# Patient Record
Sex: MALE | URBAN - METROPOLITAN AREA
[De-identification: names, ages, dates, MRNs, and addresses within clinical notes are randomized per-mention and may not be internally consistent; named-entity substitution may affect disease eponyms.]

---

## 2018-06-06 ENCOUNTER — OFFICE VISIT (OUTPATIENT)
Dept: URBAN - METROPOLITAN AREA CLINIC 29 | Facility: CLINIC | Age: 63
End: 2018-06-06
Payer: COMMERCIAL

## 2018-06-06 DIAGNOSIS — H25.813 COMBINED FORMS OF AGE-RELATED CATARACT, BILATERAL: Primary | ICD-10-CM

## 2018-06-06 PROCEDURE — 99204 OFFICE O/P NEW MOD 45 MIN: CPT | Performed by: OPHTHALMOLOGY

## 2018-06-06 RX ORDER — DUREZOL 0.5 MG/ML
0.05 % EMULSION OPHTHALMIC
Qty: 5 | Refills: 1 | Status: INACTIVE
Start: 2018-06-06 | End: 2018-07-03

## 2018-06-06 RX ORDER — BESIFLOXACIN 6 MG/ML
0.6 % SUSPENSION OPHTHALMIC
Qty: 5 | Refills: 1 | Status: INACTIVE
Start: 2018-06-06 | End: 2018-06-14

## 2018-06-06 ASSESSMENT — VISUAL ACUITY
OS: 20/40
OD: 20/20

## 2018-06-06 ASSESSMENT — INTRAOCULAR PRESSURE
OS: 19
OD: 17

## 2018-06-06 NOTE — IMPRESSION/PLAN
Impression: Combined forms of age-related cataract, bilateral: H25.813. .  Visually significant, quality of life issue, could improve with surgery. Plan: Cataracts account for the patient's complaints. Discussed all risks, benefits, alternatives, procedures and recovery. Patient understands changing glasses will not improve vision. Patient desires to have surgery, recommend phacoemulsification with intraocular lens implant OS.   lvl 2 - pt declines premium IOL

## 2018-07-30 ENCOUNTER — PROCEDURE (OUTPATIENT)
Dept: URBAN - METROPOLITAN AREA CLINIC 29 | Facility: CLINIC | Age: 63
End: 2018-07-30
Payer: COMMERCIAL

## 2018-07-30 PROCEDURE — 76519 ECHO EXAM OF EYE: CPT | Performed by: OPHTHALMOLOGY

## 2018-07-30 RX ORDER — DUREZOL 0.5 MG/ML
0.05 % EMULSION OPHTHALMIC
Qty: 5 | Refills: 1 | Status: INACTIVE
Start: 2018-07-30 | End: 2018-08-14

## 2018-07-30 RX ORDER — BESIFLOXACIN 6 MG/ML
0.6 % SUSPENSION OPHTHALMIC
Qty: 5 | Refills: 1 | Status: INACTIVE
Start: 2018-07-30 | End: 2018-08-14

## 2018-08-13 ENCOUNTER — SURGERY (OUTPATIENT)
Dept: URBAN - METROPOLITAN AREA SURGERY 11 | Facility: SURGERY | Age: 63
End: 2018-08-13
Payer: COMMERCIAL

## 2018-08-14 ENCOUNTER — POST-OPERATIVE VISIT (OUTPATIENT)
Dept: URBAN - METROPOLITAN AREA CLINIC 29 | Facility: CLINIC | Age: 63
End: 2018-08-14

## 2018-08-14 PROCEDURE — 99024 POSTOP FOLLOW-UP VISIT: CPT | Performed by: OPTOMETRIST

## 2018-08-14 RX ORDER — DUREZOL 0.5 MG/ML
0.05 % EMULSION OPHTHALMIC
Qty: 5 | Refills: 1 | Status: INACTIVE
Start: 2018-08-14 | End: 2018-08-28

## 2018-08-14 RX ORDER — BESIFLOXACIN 6 MG/ML
0.6 % SUSPENSION OPHTHALMIC
Qty: 5 | Refills: 1 | Status: INACTIVE
Start: 2018-08-14 | End: 2018-08-21

## 2018-08-14 ASSESSMENT — INTRAOCULAR PRESSURE
OS: 20
OD: 20

## 2018-08-22 ENCOUNTER — OFFICE VISIT (OUTPATIENT)
Dept: URBAN - METROPOLITAN AREA CLINIC 29 | Facility: CLINIC | Age: 63
End: 2018-08-22
Payer: COMMERCIAL

## 2018-08-22 DIAGNOSIS — Z96.1 PRESENCE OF INTRAOCULAR LENS: ICD-10-CM

## 2018-08-22 DIAGNOSIS — H25.811 COMBINED FORMS OF AGE-RELATED CATARACT, RIGHT EYE: Primary | ICD-10-CM

## 2018-08-22 PROCEDURE — 99213 OFFICE O/P EST LOW 20 MIN: CPT | Performed by: OPHTHALMOLOGY

## 2018-08-22 ASSESSMENT — INTRAOCULAR PRESSURE
OD: 20
OS: 20

## 2018-08-22 ASSESSMENT — VISUAL ACUITY: OD: 20/20

## 2018-08-22 NOTE — IMPRESSION/PLAN
Impression: Presence of intraocular lens: Z96.1. OS. Condition: established, stable. Plan: d/c abx, taper steroid per plan. d/c shield.

## 2018-08-27 ENCOUNTER — SURGERY (OUTPATIENT)
Dept: URBAN - METROPOLITAN AREA SURGERY 11 | Facility: SURGERY | Age: 63
End: 2018-08-27
Payer: COMMERCIAL

## 2018-08-27 PROCEDURE — 66984 XCAPSL CTRC RMVL W/O ECP: CPT | Performed by: OPHTHALMOLOGY

## 2018-08-28 ENCOUNTER — POST-OPERATIVE VISIT (OUTPATIENT)
Dept: URBAN - METROPOLITAN AREA CLINIC 29 | Facility: CLINIC | Age: 63
End: 2018-08-28

## 2018-08-28 PROCEDURE — 99024 POSTOP FOLLOW-UP VISIT: CPT | Performed by: OPHTHALMOLOGY

## 2018-08-28 RX ORDER — BESIFLOXACIN 6 MG/ML
0.6 % SUSPENSION OPHTHALMIC
Qty: 5 | Refills: 1 | Status: INACTIVE
Start: 2018-08-28 | End: 2018-09-04

## 2018-08-28 RX ORDER — DUREZOL 0.5 MG/ML
0.05 % EMULSION OPHTHALMIC
Qty: 5 | Refills: 1 | Status: INACTIVE
Start: 2018-08-28 | End: 2018-09-25

## 2018-08-28 ASSESSMENT — INTRAOCULAR PRESSURE
OS: 19
OD: 19

## 2018-09-04 ENCOUNTER — POST-OPERATIVE VISIT (OUTPATIENT)
Dept: URBAN - METROPOLITAN AREA CLINIC 29 | Facility: CLINIC | Age: 63
End: 2018-09-04

## 2018-09-04 DIAGNOSIS — Z09 ENCNTR FOR F/U EXAM AFT TRTMT FOR COND OTH THAN MALIG NEOPLM: Primary | ICD-10-CM

## 2018-09-04 PROCEDURE — 99024 POSTOP FOLLOW-UP VISIT: CPT | Performed by: OPTOMETRIST

## 2018-09-04 ASSESSMENT — INTRAOCULAR PRESSURE
OD: 18
OS: 18

## 2018-10-05 ENCOUNTER — POST-OPERATIVE VISIT (OUTPATIENT)
Dept: URBAN - METROPOLITAN AREA CLINIC 29 | Facility: CLINIC | Age: 63
End: 2018-10-05

## 2018-10-05 PROCEDURE — 99024 POSTOP FOLLOW-UP VISIT: CPT | Performed by: OPTOMETRIST

## 2018-10-05 ASSESSMENT — INTRAOCULAR PRESSURE
OD: 16
OS: 16

## 2019-01-07 ENCOUNTER — OFFICE VISIT (OUTPATIENT)
Dept: URBAN - METROPOLITAN AREA CLINIC 29 | Facility: CLINIC | Age: 64
End: 2019-01-07
Payer: COMMERCIAL

## 2019-01-07 DIAGNOSIS — H26.492 OTHER SECONDARY CATARACT, LEFT EYE: Primary | ICD-10-CM

## 2019-01-07 PROCEDURE — 92014 COMPRE OPH EXAM EST PT 1/>: CPT | Performed by: OPTOMETRIST

## 2019-01-07 ASSESSMENT — INTRAOCULAR PRESSURE
OS: 16
OD: 16

## 2019-01-07 NOTE — IMPRESSION/PLAN
Impression: Other secondary cataract, left eye: H26.492. OS. Plan: Discussed diagnosis in detail with patient. No treatment is required at this time. Will continue to observe condition and or symptoms. Reassured patient of current condition and treatment. Call if Symptoms occur.